# Patient Record
Sex: FEMALE | URBAN - METROPOLITAN AREA
[De-identification: names, ages, dates, MRNs, and addresses within clinical notes are randomized per-mention and may not be internally consistent; named-entity substitution may affect disease eponyms.]

---

## 2022-01-18 ENCOUNTER — LAB OUTSIDE AN ENCOUNTER (OUTPATIENT)
Dept: URBAN - METROPOLITAN AREA CLINIC 35 | Facility: CLINIC | Age: 67
End: 2022-01-18

## 2022-01-18 ENCOUNTER — OFFICE VISIT (OUTPATIENT)
Dept: URBAN - METROPOLITAN AREA CLINIC 35 | Facility: CLINIC | Age: 67
End: 2022-01-18
Payer: MEDICARE

## 2022-01-18 VITALS
HEART RATE: 74 BPM | WEIGHT: 210 LBS | SYSTOLIC BLOOD PRESSURE: 115 MMHG | HEIGHT: 70 IN | DIASTOLIC BLOOD PRESSURE: 70 MMHG | BODY MASS INDEX: 30.06 KG/M2 | OXYGEN SATURATION: 96 %

## 2022-01-18 DIAGNOSIS — K59.09 CHRONIC CONSTIPATION: ICD-10-CM

## 2022-01-18 DIAGNOSIS — Z12.11 COLON CANCER SCREENING: ICD-10-CM

## 2022-01-18 PROCEDURE — 99202 OFFICE O/P NEW SF 15 MIN: CPT | Performed by: INTERNAL MEDICINE

## 2022-01-18 PROCEDURE — 99242 OFF/OP CONSLTJ NEW/EST SF 20: CPT | Performed by: INTERNAL MEDICINE

## 2022-01-18 RX ORDER — BACLOFEN 10 MG/1
1 TABLET AS NEEDED TABLET ORAL TWICE A DAY
Status: ACTIVE | COMMUNITY

## 2022-01-18 RX ORDER — SERTRALINE HYDROCHLORIDE 100 MG/1
1 TABLET TABLET, FILM COATED ORAL ONCE A DAY
Status: ACTIVE | COMMUNITY

## 2022-01-18 RX ORDER — POLYETHYLENE GLYCOL 3350, SODIUM SULFATE, SODIUM CHLORIDE, POTASSIUM CHLORIDE, ASCORBIC ACID, SODIUM ASCORBATE 140-9-5.2G
AS DIRECTED KIT ORAL BID
Qty: 1 KIT | Refills: 0 | OUTPATIENT

## 2022-01-18 RX ORDER — SERTRALINE HYDROCHLORIDE 50 MG/1
1 TABLET TABLET, FILM COATED ORAL ONCE A DAY
Status: ACTIVE | COMMUNITY

## 2022-01-18 NOTE — HPI-COLONOSCOPY SCREENING
66 year old female who presents today for consultation for a colonoscopy   Hx of chroic constipation. 1-2 BMs per day, with normal stool. She takes Aloe vera gel capsules and and enema twice a week No melena, blood, or mucus.   Patient father had colon cancer at the age of 75.    No abdominal pain. No weight loss No CP, SOB or fever. No blood thinners. No sleep apnea No cardiac or pulmonary issues

## 2022-01-18 NOTE — PHYSICAL EXAM HENT:
Head,  normocephalic,  atraumatic,  Face,  Face within normal limits,  Ears,  External ears within normal limits,  Nose/Nasopharynx,  External nose  normal appearance,  nares patent,  no nasal discharge,  Mouth and Throat,  Oral cavity appearance normal, Lips,  Appearance normal, MP 2

## 2022-02-02 ENCOUNTER — TELEPHONE ENCOUNTER (OUTPATIENT)
Dept: URBAN - METROPOLITAN AREA CLINIC 36 | Facility: CLINIC | Age: 67
End: 2022-02-02

## 2022-02-09 ENCOUNTER — TELEPHONE ENCOUNTER (OUTPATIENT)
Dept: URBAN - METROPOLITAN AREA CLINIC 36 | Facility: CLINIC | Age: 67
End: 2022-02-09

## 2022-02-09 RX ORDER — SODIUM, POTASSIUM,MAG SULFATES 17.5-3.13G
177 ML SOLUTION, RECONSTITUTED, ORAL ORAL BID
Qty: 354 ML | Refills: 0 | OUTPATIENT
Start: 2022-02-09 | End: 2022-02-10

## 2022-02-14 ENCOUNTER — CLAIMS CREATED FROM THE CLAIM WINDOW (OUTPATIENT)
Dept: URBAN - METROPOLITAN AREA CLINIC 4 | Facility: CLINIC | Age: 67
End: 2022-02-14
Payer: MEDICARE

## 2022-02-14 ENCOUNTER — CLAIMS CREATED FROM THE CLAIM WINDOW (OUTPATIENT)
Dept: URBAN - METROPOLITAN AREA SURGERY CENTER 8 | Facility: SURGERY CENTER | Age: 67
End: 2022-02-14
Payer: MEDICARE

## 2022-02-14 ENCOUNTER — CLAIMS CREATED FROM THE CLAIM WINDOW (OUTPATIENT)
Dept: URBAN - METROPOLITAN AREA SURGERY CENTER 8 | Facility: SURGERY CENTER | Age: 67
End: 2022-02-14

## 2022-02-14 DIAGNOSIS — D12.4 BENIGN NEOPLASM OF DESCENDING COLON: ICD-10-CM

## 2022-02-14 DIAGNOSIS — D12.3 ADENOMA OF TRANSVERSE COLON: ICD-10-CM

## 2022-02-14 DIAGNOSIS — D12.3 BENIGN NEOPLASM OF TRANSVERSE COLON: ICD-10-CM

## 2022-02-14 DIAGNOSIS — D12.4 ADENOMA OF DESCENDING COLON: ICD-10-CM

## 2022-02-14 DIAGNOSIS — K63.89 GASEOUS DISTENTION OF INTESTINE DETERMINED BY X-RAY: ICD-10-CM

## 2022-02-14 DIAGNOSIS — Z53.8 FAILED ATTEMPTED SURGICAL PROCEDURE: ICD-10-CM

## 2022-02-14 PROCEDURE — 88305 TISSUE EXAM BY PATHOLOGIST: CPT | Performed by: PATHOLOGY

## 2022-02-14 PROCEDURE — G8907 PT DOC NO EVENTS ON DISCHARG: HCPCS | Performed by: INTERNAL MEDICINE

## 2022-02-14 PROCEDURE — 45380 COLONOSCOPY AND BIOPSY: CPT | Performed by: INTERNAL MEDICINE

## 2022-02-14 PROCEDURE — 45385 COLONOSCOPY W/LESION REMOVAL: CPT | Performed by: INTERNAL MEDICINE

## 2022-02-14 RX ORDER — BACLOFEN 10 MG/1
1 TABLET AS NEEDED TABLET ORAL TWICE A DAY
Status: ACTIVE | COMMUNITY

## 2022-02-14 RX ORDER — POLYETHYLENE GLYCOL 3350, SODIUM SULFATE, SODIUM CHLORIDE, POTASSIUM CHLORIDE, ASCORBIC ACID, SODIUM ASCORBATE 140-9-5.2G
AS DIRECTED KIT ORAL BID
Qty: 1 KIT | Refills: 0 | Status: ACTIVE | COMMUNITY

## 2022-02-14 RX ORDER — SERTRALINE HYDROCHLORIDE 50 MG/1
1 TABLET TABLET, FILM COATED ORAL ONCE A DAY
Status: ACTIVE | COMMUNITY

## 2022-02-14 RX ORDER — SERTRALINE HYDROCHLORIDE 100 MG/1
1 TABLET TABLET, FILM COATED ORAL ONCE A DAY
Status: ACTIVE | COMMUNITY

## 2022-02-28 ENCOUNTER — DASHBOARD ENCOUNTERS (OUTPATIENT)
Age: 67
End: 2022-02-28

## 2022-03-01 ENCOUNTER — OFFICE VISIT (OUTPATIENT)
Dept: URBAN - METROPOLITAN AREA CLINIC 35 | Facility: CLINIC | Age: 67
End: 2022-03-01

## 2022-03-01 RX ORDER — SERTRALINE HYDROCHLORIDE 50 MG/1
1 TABLET TABLET, FILM COATED ORAL ONCE A DAY
Status: ACTIVE | COMMUNITY

## 2022-03-01 RX ORDER — SERTRALINE HYDROCHLORIDE 100 MG/1
1 TABLET TABLET, FILM COATED ORAL ONCE A DAY
Status: ACTIVE | COMMUNITY

## 2022-03-01 RX ORDER — POLYETHYLENE GLYCOL 3350, SODIUM SULFATE, SODIUM CHLORIDE, POTASSIUM CHLORIDE, ASCORBIC ACID, SODIUM ASCORBATE 140-9-5.2G
AS DIRECTED KIT ORAL BID
Qty: 1 KIT | Refills: 0 | Status: ACTIVE | COMMUNITY

## 2022-03-01 RX ORDER — BACLOFEN 10 MG/1
1 TABLET AS NEEDED TABLET ORAL TWICE A DAY
Status: ACTIVE | COMMUNITY

## 2022-03-01 NOTE — HPI-COLONOSCOPY SCREENING
67 y/o female patient presents today for follow up to her colonoscopy, which was completed on (02/14/022) by Dr. Granda.  Patient admits/denies any complications after her procedure. Patient currently admits __ formed bowel movements per day. Patient denies any melena, blood or mucus in stools. Patient denies any associated abdominal pain, heartburn, bloating, or gas.   Colonoscopy report shows: - Preparation of the colon was fair to poor. - The examined portion of the ileum was normal. - Three 2 to 4 mm polyps at the hepatic flexure, removed with a cold biopsy forceps. Resected and retrieved. - One 5 mm polyp in the proximal descending colon, removed with a cold snare. Resected and retrieved. - Severe Melanosis in the colon. - Non-bleeding internal hemorrhoids. - Redundant colon. - Colonic lumen is of a large diameter. - Hemorrhoids found on perianal exam.      Last visit (01/18/2022) 66 year old female who presents today for consultation for a colonoscopy   Hx of chroic constipation. 1-2 BMs per day, with normal stool. She takes Aloe vera gel capsules and and enema twice a week No melena, blood, or mucus.   Patient father had colon cancer at the age of 75.    No abdominal pain. No weight loss No CP, SOB or fever. No blood thinners. No sleep apnea No cardiac or pulmonary issues

## 2022-03-03 ENCOUNTER — TELEPHONE ENCOUNTER (OUTPATIENT)
Dept: URBAN - METROPOLITAN AREA CLINIC 36 | Facility: CLINIC | Age: 67
End: 2022-03-03